# Patient Record
Sex: FEMALE | URBAN - METROPOLITAN AREA
[De-identification: names, ages, dates, MRNs, and addresses within clinical notes are randomized per-mention and may not be internally consistent; named-entity substitution may affect disease eponyms.]

---

## 2020-03-16 ENCOUNTER — APPOINTMENT (OUTPATIENT)
Age: 65
Setting detail: DERMATOLOGY
End: 2020-03-16

## 2020-03-16 DIAGNOSIS — Z41.9 ENCOUNTER FOR PROCEDURE FOR PURPOSES OTHER THAN REMEDYING HEALTH STATE, UNSPECIFIED: ICD-10-CM

## 2020-03-16 PROCEDURE — OTHER BOTOX: OTHER

## 2020-03-16 NOTE — PROCEDURE: BOTOX
Right Periorbital Units: 0
Show Right And Left Pupillary Line Units: No
Show Lateral Platysmal Band Units: Yes
Additional Area 6 Units: 21
Additional Area 5 Location: [facility]
Dilution (U/0.1 Cc): 4
Additional Area 4 Location: Lips (O.O.)
Price (Use Numbers Only, No Special Characters Or $): 294
Additional Area 3 Location: [facility]
Consent: ***DOCUMENTED ON PAPER _ SEE SCANNED DOCUMENTS\\n\\nBOTOX  - Treatment Performed Today.
Detail Level: Detailed
Additional Area 6 Location: TOTAL UNITS
Additional Area 2 Location: Nasalis
Additional Area 1 Location: \"Chemical Brow Lift\"